# Patient Record
Sex: FEMALE | Race: BLACK OR AFRICAN AMERICAN | URBAN - METROPOLITAN AREA
[De-identification: names, ages, dates, MRNs, and addresses within clinical notes are randomized per-mention and may not be internally consistent; named-entity substitution may affect disease eponyms.]

---

## 2018-02-27 ENCOUNTER — OP HISTORICAL/CONVERTED ENCOUNTER (OUTPATIENT)
Dept: OTHER | Age: 83
End: 2018-02-27

## 2019-01-22 ENCOUNTER — OP HISTORICAL/CONVERTED ENCOUNTER (OUTPATIENT)
Dept: OTHER | Age: 84
End: 2019-01-22

## 2019-03-15 ENCOUNTER — OP HISTORICAL/CONVERTED ENCOUNTER (OUTPATIENT)
Dept: OTHER | Age: 84
End: 2019-03-15

## 2025-03-09 ENCOUNTER — APPOINTMENT (OUTPATIENT)
Facility: HOSPITAL | Age: 89
End: 2025-03-09
Payer: MEDICARE

## 2025-03-09 ENCOUNTER — HOSPITAL ENCOUNTER (EMERGENCY)
Facility: HOSPITAL | Age: 89
Discharge: HOME | End: 2025-03-09
Attending: EMERGENCY MEDICINE
Payer: MEDICARE

## 2025-03-09 VITALS
HEART RATE: 60 BPM | TEMPERATURE: 98.1 F | SYSTOLIC BLOOD PRESSURE: 143 MMHG | BODY MASS INDEX: 33.66 KG/M2 | DIASTOLIC BLOOD PRESSURE: 44 MMHG | RESPIRATION RATE: 18 BRPM | OXYGEN SATURATION: 97 % | WEIGHT: 190 LBS | HEIGHT: 63 IN

## 2025-03-09 DIAGNOSIS — L97.929 VENOUS STASIS ULCERS OF BOTH LOWER EXTREMITIES (HCC): ICD-10-CM

## 2025-03-09 DIAGNOSIS — I83.029 VENOUS STASIS ULCERS OF BOTH LOWER EXTREMITIES (HCC): ICD-10-CM

## 2025-03-09 DIAGNOSIS — S09.90XA CLOSED HEAD INJURY, INITIAL ENCOUNTER: Primary | ICD-10-CM
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
## 2025-03-09 DIAGNOSIS — L97.919 VENOUS STASIS ULCERS OF BOTH LOWER EXTREMITIES (HCC): ICD-10-CM

## 2025-03-09 DIAGNOSIS — I83.019 VENOUS STASIS ULCERS OF BOTH LOWER EXTREMITIES (HCC): ICD-10-CM

## 2025-03-09 PROCEDURE — 73030 X-RAY EXAM OF SHOULDER: CPT

## 2025-03-09 PROCEDURE — 70450 CT HEAD/BRAIN W/O DYE: CPT

## 2025-03-09 PROCEDURE — 72125 CT NECK SPINE W/O DYE: CPT

## 2025-03-09 PROCEDURE — 6370000000 HC RX 637 (ALT 250 FOR IP): Performed by: EMERGENCY MEDICINE

## 2025-03-09 PROCEDURE — 99284 EMERGENCY DEPT VISIT MOD MDM: CPT

## 2025-03-09 RX ORDER — METHOCARBAMOL 750 MG/1
750 TABLET, FILM COATED ORAL ONCE
Status: COMPLETED | OUTPATIENT
Start: 2025-03-09 | End: 2025-03-09

## 2025-03-09 RX ORDER — ACETAMINOPHEN 325 MG/1
650 TABLET ORAL
Status: COMPLETED | OUTPATIENT
Start: 2025-03-09 | End: 2025-03-09

## 2025-03-09 RX ORDER — METHOCARBAMOL 500 MG/1
500 TABLET, FILM COATED ORAL EVERY 8 HOURS PRN
Qty: 15 TABLET | Refills: 0 | Status: SHIPPED | OUTPATIENT
Start: 2025-03-09 | End: 2025-03-14

## 2025-03-09 RX ADMIN — METHOCARBAMOL 750 MG: 750 TABLET ORAL at 04:01

## 2025-03-09 RX ADMIN — ACETAMINOPHEN 650 MG: 325 TABLET ORAL at 04:01

## 2025-03-09 ASSESSMENT — PAIN - FUNCTIONAL ASSESSMENT
PAIN_FUNCTIONAL_ASSESSMENT: NONE - DENIES PAIN
PAIN_FUNCTIONAL_ASSESSMENT: 0-10

## 2025-03-09 ASSESSMENT — PAIN SCALES - GENERAL: PAINLEVEL_OUTOF10: 8

## 2025-03-09 ASSESSMENT — LIFESTYLE VARIABLES
HOW OFTEN DO YOU HAVE A DRINK CONTAINING ALCOHOL: NEVER
HOW MANY STANDARD DRINKS CONTAINING ALCOHOL DO YOU HAVE ON A TYPICAL DAY: PATIENT DOES NOT DRINK

## 2025-03-09 NOTE — ED NOTES
Wound on LLE irrigated w/ saline. Applied nonadherent pad per   Verbal instructions from Dr. Bryant.

## 2025-03-09 NOTE — DISCHARGE INSTRUCTIONS
Follow-up with your primary care doctor.  I also included the name of a vascular specialist you can also call to follow-up with.  Call your doctor on Monday to discuss your ER visit in the next steps in your care.  Use caution while taking the Robaxin/methocarbamol as it could potentially cause some drowsiness so you should not try to walk or do potentially dangerous activities when you for start taking it and see how you will respond to the medication.            Thank you for choosing our Emergency Department for your care.  It is our privilege to care for you in your time of need.  In the next several days, you may receive a survey via email or mailed to your home about your experience with our team.  We would greatly appreciate you taking a few minutes to complete the survey, as we use this information to learn what we have done well and what we could be doing better. Thank you for trusting us with your care!    Below you will find a list of your tests from today's visit.   Labs and Radiology Studies  No results found for this or any previous visit (from the past 12 hours).  CT CSpine W/O Contrast  Result Date: 3/9/2025  EXAM:  CT CERVICAL SPINE WITHOUT CONTRAST INDICATION: Fall with head injury COMPARISON: None. CONTRAST:  None. TECHNIQUE: Multislice helical CT of the cervical spine was performed without intravenous contrast administration.  Sagittal and coronal reformats were generated.  CT dose reduction was achieved through use of a standardized protocol tailored for this examination and automatic exposure control for dose modulation. FINDINGS: There is no acute fracture or subluxation. Vertebral body heights are maintained. There are multilevel degenerative changes. There is no abnormality in alignment. The paraspinal soft tissues are unremarkable. The visualized lung apices are clear.     No acute abnormality. Multilevel degenerative changes. Electronically signed by Luiz Sinha    CT Head W/O

## 2025-03-09 NOTE — ED TRIAGE NOTES
Pt states she tripped and fell a few days ago and hit the front of her head. Denies loc, not on blood thinners     Denies head pain, vision changes, N/V

## 2025-03-09 NOTE — ED PROVIDER NOTES
them looked at as well while she was here.  The patient has never seen a vascular specialist but she says her PCP did obtain ultrasounds in the recent past.  She has a new wound to probably from the fall that is fairly superficial along the outer left leg.    We obtained a CT head and C-spine to rule out intracranial process or C-spine fracture as well as a left shoulder x-ray.  On my secondary survey of the patient I do not identify any additional injuries or other areas of concern.  Will recommend she follow-up with vascular, will discharge home, will give Robaxin to use at home recommended sparing use and not to take if she needs to get up and ambulate due to potential for some degree of sedation.         Records Reviewed (source and summary of external notes): Prior medical records and Nursing notes    Vitals:    Vitals:    03/09/25 0345 03/09/25 0400 03/09/25 0415 03/09/25 0542   BP: (!) 161/56 (!) 154/46 (!) 159/51 (!) 143/44   Pulse:    60   Resp:    18   Temp:    98.1 °F (36.7 °C)   TempSrc:    Oral   SpO2: 100% 100%  97%   Weight:       Height:            ED COURSE       SEPSIS Reassessment: Patient does NOT meet Sepsis criteria after ED workup    Clinical Management Tools:  Not Applicable    Patient was given the following medications:  Medications   methocarbamol (ROBAXIN) tablet 750 mg (750 mg Oral Given 3/9/25 0401)   acetaminophen (TYLENOL) tablet 650 mg (650 mg Oral Given 3/9/25 0401)       CONSULTS: See ED Course/MDM for further details.  None     Social Determinants affecting Diagnosis/Treatment: None    Smoking Cessation: Not Applicable    PROCEDURES   Unless otherwise noted above, none.  Procedures      CRITICAL CARE TIME   Patient does not meet Critical Care Time, 0 minutes    ED IMPRESSION     1. Closed head injury, initial encounter    2. Fall, initial encounter    3. Venous stasis ulcers of both lower extremities (HCC)          DISPOSITION/PLAN   DISPOSITION Decision To Discharge 03/09/2025